# Patient Record
Sex: MALE | URBAN - METROPOLITAN AREA
[De-identification: names, ages, dates, MRNs, and addresses within clinical notes are randomized per-mention and may not be internally consistent; named-entity substitution may affect disease eponyms.]

---

## 2024-12-02 ENCOUNTER — APPOINTMENT (RX ONLY)
Dept: URBAN - METROPOLITAN AREA CLINIC 281 | Facility: CLINIC | Age: 54
Setting detail: DERMATOLOGY
End: 2024-12-02

## 2024-12-02 DIAGNOSIS — L30.0 NUMMULAR DERMATITIS: ICD-10-CM | Status: WORSENING

## 2024-12-02 PROCEDURE — ? COUNSELING

## 2024-12-02 PROCEDURE — 99204 OFFICE O/P NEW MOD 45 MIN: CPT

## 2024-12-02 PROCEDURE — ? FULL BODY SKIN EXAM - DECLINED

## 2024-12-02 PROCEDURE — ? PRESCRIPTION MEDICATION MANAGEMENT

## 2024-12-02 PROCEDURE — ? PRESCRIPTION

## 2024-12-02 RX ORDER — PREDNISONE 20 MG/1
TABLET ORAL
Qty: 30 | Refills: 0 | Status: ERX

## 2024-12-02 RX ORDER — BETAMETHASONE DIPROPIONATE 0.5 MG/G
CREAM TOPICAL BID
Qty: 45 | Refills: 2 | Status: ERX | COMMUNITY
Start: 2024-12-02

## 2024-12-02 RX ADMIN — BETAMETHASONE DIPROPIONATE: 0.5 CREAM TOPICAL at 00:00

## 2024-12-02 ASSESSMENT — LOCATION SIMPLE DESCRIPTION DERM
LOCATION SIMPLE: LEFT PRETIBIAL REGION
LOCATION SIMPLE: RIGHT PRETIBIAL REGION

## 2024-12-02 ASSESSMENT — LOCATION DETAILED DESCRIPTION DERM
LOCATION DETAILED: RIGHT DISTAL PRETIBIAL REGION
LOCATION DETAILED: LEFT DISTAL PRETIBIAL REGION

## 2024-12-02 ASSESSMENT — LOCATION ZONE DERM: LOCATION ZONE: LEG

## 2024-12-02 NOTE — PROCEDURE: PRESCRIPTION MEDICATION MANAGEMENT
Initiate Treatment: prednisone 20 mg tablet \\nQuantity: 30.0 Tablet  Days Supply: 15\\nSig: 3 tabs (60mg) for 5 days, 2 tabs (40mg) for 5 days, 1 tab (20mg) for 5 days\\n\\nbetamethasone dipropionate 0.05 % topical cream Bid\\nQuantity: 45.0 g  Days Supply: 30\\nSig: ATAA on the arms and legs BID PRN until clear. Do not use for more than 2 weeks continuously.
Detail Level: Zone
Render In Strict Bullet Format?: No

## 2024-12-23 ENCOUNTER — APPOINTMENT (OUTPATIENT)
Dept: URBAN - METROPOLITAN AREA CLINIC 281 | Facility: CLINIC | Age: 54
Setting detail: DERMATOLOGY
End: 2024-12-23

## 2024-12-23 DIAGNOSIS — L30.0 NUMMULAR DERMATITIS: ICD-10-CM | Status: STABLE

## 2024-12-23 PROCEDURE — ? FULL BODY SKIN EXAM - DECLINED

## 2024-12-23 PROCEDURE — ? PRESCRIPTION

## 2024-12-23 PROCEDURE — 99213 OFFICE O/P EST LOW 20 MIN: CPT

## 2024-12-23 PROCEDURE — ? MDM - TREATMENT GOALS

## 2024-12-23 PROCEDURE — ? PRESCRIPTION MEDICATION MANAGEMENT

## 2024-12-23 PROCEDURE — ? COUNSELING

## 2024-12-23 RX ORDER — TRIAMCINOLONE ACETONIDE 1 MG/G
CREAM TOPICAL BID PRN
Qty: 454 | Refills: 3 | Status: ACTIVE

## 2024-12-23 RX ADMIN — TRIAMCINOLONE ACETONIDE: 1 CREAM TOPICAL at 00:00

## 2024-12-23 ASSESSMENT — LOCATION SIMPLE DESCRIPTION DERM
LOCATION SIMPLE: RIGHT PRETIBIAL REGION
LOCATION SIMPLE: LEFT PRETIBIAL REGION

## 2024-12-23 ASSESSMENT — LOCATION DETAILED DESCRIPTION DERM
LOCATION DETAILED: RIGHT DISTAL PRETIBIAL REGION
LOCATION DETAILED: LEFT DISTAL PRETIBIAL REGION

## 2024-12-23 ASSESSMENT — LOCATION ZONE DERM: LOCATION ZONE: LEG

## 2024-12-23 NOTE — PROCEDURE: PRESCRIPTION MEDICATION MANAGEMENT
Continue Regimen: prednisone 20 mg tablet \\nQuantity: 30.0 Tablet  Days Supply: 15\\nSig: 3 tabs (60mg) for 5 days, 2 tabs (40mg) for 5 days, 1 tab (20mg) for 5 days
Discontinue Regimen: betamethasone dipropionate 0.05 % topical cream\\nQuantity: 45.0 g  Days Supply: 30\\nSig: ATAA on the arms and legs BID PRN until clear. Do not use for more than 2 weeks continuously.\\n\\nprednisone 20 mg tablet \\nQuantity: 30.0 Tablet  Days Supply: 15\\nSig: 3 tabs (60mg) for 5 days, 2 tabs (40mg) for 5 days, 1 tab (20mg) for 5 days
Initiate Treatment: Triamcinolone Cream 0.01 % topical cream
Detail Level: Zone
Render In Strict Bullet Format?: No